# Patient Record
Sex: FEMALE | Race: WHITE | NOT HISPANIC OR LATINO | Employment: STUDENT | RURAL
[De-identification: names, ages, dates, MRNs, and addresses within clinical notes are randomized per-mention and may not be internally consistent; named-entity substitution may affect disease eponyms.]

---
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
## 2022-01-17 ENCOUNTER — OFFICE VISIT (OUTPATIENT)
Dept: FAMILY MEDICINE | Facility: CLINIC | Age: 22
End: 2022-01-17
Payer: MEDICAID

## 2022-01-17 VITALS
WEIGHT: 147 LBS | RESPIRATION RATE: 20 BRPM | DIASTOLIC BLOOD PRESSURE: 64 MMHG | HEIGHT: 62 IN | HEART RATE: 99 BPM | OXYGEN SATURATION: 98 % | BODY MASS INDEX: 27.05 KG/M2 | SYSTOLIC BLOOD PRESSURE: 112 MMHG

## 2022-01-17 DIAGNOSIS — J02.9 SORE THROAT: ICD-10-CM

## 2022-01-17 DIAGNOSIS — R52 BODY ACHES: ICD-10-CM

## 2022-01-17 DIAGNOSIS — R05.9 COUGH: ICD-10-CM

## 2022-01-17 DIAGNOSIS — U07.1 COVID-19: Primary | ICD-10-CM

## 2022-01-17 PROCEDURE — 1159F MED LIST DOCD IN RCRD: CPT | Mod: CPTII,,, | Performed by: NURSE PRACTITIONER

## 2022-01-17 PROCEDURE — 99212 PR OFFICE/OUTPT VISIT, EST, LEVL II, 10-19 MIN: ICD-10-PCS | Mod: ,,, | Performed by: NURSE PRACTITIONER

## 2022-01-17 PROCEDURE — 3008F PR BODY MASS INDEX (BMI) DOCUMENTED: ICD-10-PCS | Mod: CPTII,,, | Performed by: NURSE PRACTITIONER

## 2022-01-17 PROCEDURE — 1160F PR REVIEW ALL MEDS BY PRESCRIBER/CLIN PHARMACIST DOCUMENTED: ICD-10-PCS | Mod: CPTII,,, | Performed by: NURSE PRACTITIONER

## 2022-01-17 PROCEDURE — 3074F SYST BP LT 130 MM HG: CPT | Mod: CPTII,,, | Performed by: NURSE PRACTITIONER

## 2022-01-17 PROCEDURE — 3008F BODY MASS INDEX DOCD: CPT | Mod: CPTII,,, | Performed by: NURSE PRACTITIONER

## 2022-01-17 PROCEDURE — 3078F DIAST BP <80 MM HG: CPT | Mod: CPTII,,, | Performed by: NURSE PRACTITIONER

## 2022-01-17 PROCEDURE — 1160F RVW MEDS BY RX/DR IN RCRD: CPT | Mod: CPTII,,, | Performed by: NURSE PRACTITIONER

## 2022-01-17 PROCEDURE — 3074F PR MOST RECENT SYSTOLIC BLOOD PRESSURE < 130 MM HG: ICD-10-PCS | Mod: CPTII,,, | Performed by: NURSE PRACTITIONER

## 2022-01-17 PROCEDURE — 99212 OFFICE O/P EST SF 10 MIN: CPT | Mod: ,,, | Performed by: NURSE PRACTITIONER

## 2022-01-17 PROCEDURE — 1159F PR MEDICATION LIST DOCUMENTED IN MEDICAL RECORD: ICD-10-PCS | Mod: CPTII,,, | Performed by: NURSE PRACTITIONER

## 2022-01-17 PROCEDURE — 87426 SARSCOV CORONAVIRUS AG IA: CPT | Mod: RHCUB | Performed by: NURSE PRACTITIONER

## 2022-01-17 PROCEDURE — 87880 STREP A ASSAY W/OPTIC: CPT | Mod: RHCUB | Performed by: NURSE PRACTITIONER

## 2022-01-17 PROCEDURE — 3078F PR MOST RECENT DIASTOLIC BLOOD PRESSURE < 80 MM HG: ICD-10-PCS | Mod: CPTII,,, | Performed by: NURSE PRACTITIONER

## 2022-01-17 NOTE — PROGRESS NOTES
[facility] - FAMILY MEDICINE       PATIENT NAME: Chong Lombardo   : 2000    AGE: 21 y.o. DATE: 2022    MRN: [id number]        Reason for Visit / Chief Complaint:  URI (Fever(101.5), chills, loss of appetite, productive cough, headache, body aches, congestion, drainage, sore throat x 1 day. No SOB, loss of taste/smell. Unvaccinated. No known exposure. )     Subjective:     Presents with mom for car visit due to COVID-19 concerns and screening guidelines. Chief complaint reviewed. C/o symptoms as indicated in ROS x 1 day. Other siblings with s/s - sister tested negative with rapid .    Review of Systems:    Review of Systems   Constitutional: Positive for appetite change, chills and fever. Negative for fatigue.   HENT: Positive for congestion, postnasal drip and sore throat. Negative for ear pain and sinus pain.    Respiratory: Positive for cough. Negative for shortness of breath and wheezing.    Cardiovascular: Negative for chest pain.   Gastrointestinal: Negative for abdominal pain, diarrhea, nausea and vomiting.   Musculoskeletal: Positive for myalgias.   Skin: Negative.    Neurological: Positive for headaches.        Review of patient's allergies indicates:  No Known Allergies     Med List:  Current Outpatient Medications on File Prior to Visit   Medication Sig Dispense Refill    cyproheptadine (PERIACTIN) 4 mg tablet Take 1 tablet (4 mg total) by mouth 3 (three) times daily as needed (decreased appetite). (Patient not taking: Reported on 2022) 90 tablet 0    FLUoxetine 20 MG capsule Take 20 mg by mouth once daily.       ondansetron (ZOFRAN) 8 MG tablet Take 1 tablet (8 mg total) by mouth every 8 (eight) hours as needed for Nausea. (Patient not taking: Reported on 2022) 15 tablet 0    XULANE 150-35 mcg/24 hr APPLY ONE PATCH TO SKIN ONCE A WEEK FOR 3 WEEKS; LEAVE OFF FOR ONE WEEK       No current facility-administered  "medications on file prior to visit.       Medical/Social/Family History:  Past Medical History:   Diagnosis Date    Anxiety       Social History     Tobacco Use   Smoking Status Never Smoker   Smokeless Tobacco Never Used        There is no immunization history on file for this patient.     Objective:      Vitals:    01/17/22 1109   BP: 112/64   BP Location: Right arm   Patient Position: Sitting   BP Method: Large (Manual)   Pulse: 99   Resp: 20   SpO2: 98%   Weight: 66.7 kg (147 lb)  Comment: Pt estimated   Height: 5' 2" (1.575 m)     Body mass index is 26.89 kg/m².     Physical Exam:    Physical Exam  Vitals and nursing note reviewed.   Constitutional:       General: She is not in acute distress.     Appearance: Normal appearance. She is not ill-appearing.   HENT:      Head: Normocephalic.   Eyes:      Conjunctiva/sclera: Conjunctivae normal.   Cardiovascular:      Rate and Rhythm: Normal rate.   Pulmonary:      Effort: Pulmonary effort is normal. No respiratory distress.   Skin:     Coloration: Skin is not pale.   Neurological:      Mental Status: She is alert and oriented to person, place, and time.         Assessment:          ICD-10-CM ICD-9-CM   1. COVID-19  U07.1 079.89   2. Sore throat  J02.9 462   3. Cough  R05.9 786.2   4. Body aches  R52 780.96        Plan:       COVID-19    Sore throat  -     SARS Coronavirus 2 Antigen, POCT  -     POCT rapid strep A    Cough  -     SARS Coronavirus 2 Antigen, POCT    Body aches  -     SARS Coronavirus 2 Antigen, POCT        Current Outpatient Medications:     cyproheptadine (PERIACTIN) 4 mg tablet, Take 1 tablet (4 mg total) by mouth 3 (three) times daily as needed (decreased appetite). (Patient not taking: Reported on 1/17/2022), Disp: 90 tablet, Rfl: 0    FLUoxetine 20 MG capsule, Take 20 mg by mouth once daily. , Disp: , Rfl:     ondansetron (ZOFRAN) 8 MG tablet, Take 1 tablet (8 mg total) by mouth every 8 (eight) hours as needed for Nausea. (Patient not taking: " Reported on 1/17/2022), Disp: 15 tablet, Rfl: 0    XULANE 150-35 mcg/24 hr, APPLY ONE PATCH TO SKIN ONCE A WEEK FOR 3 WEEKS; LEAVE OFF FOR ONE WEEK, Disp: , Rfl:       Requested Prescriptions      No prescriptions requested or ordered in this encounter       Rapid strep negative  Rapid COVID POSITIVE     Advised to quarantine per updated CDC guidelines.  Advised COVID-19 is a virus and viruses do not respond to antibiotics nor do antibiotics prevent a bacterial infection from developing.   Advised there is still some delta variant COVID-19 circulating, but the Omicron variant seems to be rapidly increasing in the area and tends to have milder symptoms that are shorter in duration.     Advised of the nationwide shortage of COVID MABs, and the very limited supply for very high risk patient's only.   Risk of COVID-19 Complications score: 0     Treatment for COVID-19 is primarily supportive including rest, increase fluids, and the use of OTC meds to help alleviate symptoms.    For immune support take vitamin C, vitamin D, and zinc 50 mg daily.  Acetaminophen or ibuprofen as needed for body aches, headache, or fever.  Home O2 sat monitoring can be used if experiencing shortness of breath.   Advised to report to ED for evaluation of any new or worsening chest pain, shortness of breath, or other severe symptoms.  Call the office if you have other questions or concerns that arise.    F/u as needed or if symptoms worsen or persist.    Signature: Fallon Brooke [facility]

## 2022-01-17 NOTE — LETTER
January 17, 2022      [facility] [place] [address] [place] [address]  Phone: [phone]  Fax: [phone]       Patient: Chong Lombardo   YOB: 2000  Date of Visit: 01/17/2022    To Whom It May Concern:    Macho Lombardo  was at [facility] on 01/17/2022. The patient may return to work/school on 01/24/2022 with no restrictions. If you have any questions or concerns, or if I can be of further assistance, please do not hesitate to contact me.    Sincerely,    LIANNA Castellanos

## 2022-06-30 DIAGNOSIS — M25.562 LEFT KNEE PAIN, UNSPECIFIED CHRONICITY: Primary | ICD-10-CM

## 2022-07-06 DIAGNOSIS — M25.562 LEFT KNEE PAIN, UNSPECIFIED CHRONICITY: Primary | ICD-10-CM

## 2022-07-13 ENCOUNTER — OFFICE VISIT (OUTPATIENT)
Dept: ORTHOPEDICS | Facility: CLINIC | Age: 22
End: 2022-07-13
Payer: MEDICAID

## 2022-07-13 ENCOUNTER — HOSPITAL ENCOUNTER (OUTPATIENT)
Dept: RADIOLOGY | Facility: HOSPITAL | Age: 22
Discharge: HOME | End: 2022-07-13
Attending: NURSE PRACTITIONER
Payer: MEDICAID

## 2022-07-13 DIAGNOSIS — M25.562 LEFT KNEE PAIN, UNSPECIFIED CHRONICITY: ICD-10-CM

## 2022-07-13 DIAGNOSIS — M76.52 PATELLAR TENDINITIS OF LEFT KNEE: Primary | ICD-10-CM

## 2022-07-13 PROCEDURE — 99203 OFFICE O/P NEW LOW 30 MIN: CPT | Mod: ,,, | Performed by: NURSE PRACTITIONER

## 2022-07-13 PROCEDURE — 99203 PR OFFICE/OUTPT VISIT, NEW, LEVL III, 30-44 MIN: ICD-10-PCS | Mod: ,,, | Performed by: NURSE PRACTITIONER

## 2022-07-13 PROCEDURE — 1159F MED LIST DOCD IN RCRD: CPT | Mod: CPTII,,, | Performed by: NURSE PRACTITIONER

## 2022-07-13 PROCEDURE — 73562 XR KNEE 3 VIEW LEFT: ICD-10-PCS | Mod: 26,LT,, | Performed by: RADIOLOGY

## 2022-07-13 PROCEDURE — 73562 X-RAY EXAM OF KNEE 3: CPT | Mod: TC,LT

## 2022-07-13 PROCEDURE — 1159F PR MEDICATION LIST DOCUMENTED IN MEDICAL RECORD: ICD-10-PCS | Mod: CPTII,,, | Performed by: NURSE PRACTITIONER

## 2022-07-13 PROCEDURE — 73562 X-RAY EXAM OF KNEE 3: CPT | Mod: 26,LT,, | Performed by: RADIOLOGY

## 2022-07-13 RX ORDER — NAPROXEN 500 MG/1
500 TABLET ORAL 2 TIMES DAILY WITH MEALS
Qty: 60 TABLET | Refills: 1 | Status: SHIPPED | OUTPATIENT
Start: 2022-07-13
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
## 2022-08-31 DIAGNOSIS — M76.52 PATELLAR TENDINITIS OF LEFT KNEE: Primary | ICD-10-CM
